# Patient Record
Sex: MALE | URBAN - METROPOLITAN AREA
[De-identification: names, ages, dates, MRNs, and addresses within clinical notes are randomized per-mention and may not be internally consistent; named-entity substitution may affect disease eponyms.]

---

## 2024-06-27 ENCOUNTER — APPOINTMENT (OUTPATIENT)
Dept: URBAN - METROPOLITAN AREA CLINIC 197 | Age: 74
Setting detail: DERMATOLOGY
End: 2024-06-27

## 2024-06-27 DIAGNOSIS — Q82.5 CONGENITAL NON-NEOPLASTIC NEVUS: ICD-10-CM

## 2024-06-27 DIAGNOSIS — D485 NEOPLASM OF UNCERTAIN BEHAVIOR OF SKIN: ICD-10-CM

## 2024-06-27 DIAGNOSIS — L82.1 OTHER SEBORRHEIC KERATOSIS: ICD-10-CM

## 2024-06-27 DIAGNOSIS — L57.0 ACTINIC KERATOSIS: ICD-10-CM

## 2024-06-27 PROBLEM — D48.5 NEOPLASM OF UNCERTAIN BEHAVIOR OF SKIN: Status: ACTIVE | Noted: 2024-06-27

## 2024-06-27 PROCEDURE — OTHER LIQUID NITROGEN: OTHER

## 2024-06-27 PROCEDURE — 99202 OFFICE O/P NEW SF 15 MIN: CPT | Mod: 25

## 2024-06-27 PROCEDURE — OTHER BIOPSY BY SHAVE METHOD: OTHER

## 2024-06-27 PROCEDURE — OTHER COUNSELING: OTHER

## 2024-06-27 PROCEDURE — 11103 TANGNTL BX SKIN EA SEP/ADDL: CPT

## 2024-06-27 PROCEDURE — OTHER MIPS QUALITY: OTHER

## 2024-06-27 PROCEDURE — 17003 DESTRUCT PREMALG LES 2-14: CPT

## 2024-06-27 PROCEDURE — 17000 DESTRUCT PREMALG LESION: CPT | Mod: 59

## 2024-06-27 PROCEDURE — 11102 TANGNTL BX SKIN SINGLE LES: CPT

## 2024-06-27 ASSESSMENT — LOCATION SIMPLE DESCRIPTION DERM
LOCATION SIMPLE: RIGHT CHEEK
LOCATION SIMPLE: POSTERIOR NECK
LOCATION SIMPLE: SCALP
LOCATION SIMPLE: NECK
LOCATION SIMPLE: LEFT CHEEK
LOCATION SIMPLE: NOSE

## 2024-06-27 ASSESSMENT — LOCATION ZONE DERM
LOCATION ZONE: SCALP
LOCATION ZONE: NOSE
LOCATION ZONE: NECK
LOCATION ZONE: FACE

## 2024-06-27 ASSESSMENT — LOCATION DETAILED DESCRIPTION DERM
LOCATION DETAILED: LEFT CENTRAL LATERAL NECK
LOCATION DETAILED: NASAL ROOT
LOCATION DETAILED: RIGHT SUPERIOR CENTRAL MALAR CHEEK
LOCATION DETAILED: LEFT SUPERIOR PARIETAL SCALP
LOCATION DETAILED: LEFT INFERIOR LATERAL MALAR CHEEK
LOCATION DETAILED: LEFT POSTERIOR NECK

## 2024-06-27 NOTE — PROCEDURE: LIQUID NITROGEN
Render Note In Bullet Format When Appropriate: No
Detail Level: Simple
Consent: The patient's consent was obtained including but not limited to risks of crusting, scabbing, blistering, scarring, darker or lighter pigmentary change, recurrence, incomplete removal and infection.
Show Aperture Variable?: Yes
Number Of Freeze-Thaw Cycles: 1 freeze-thaw cycle
Post-Care Instructions: I reviewed with the patient in detail post-care instructions. Patient is to wear sunprotection, and avoid picking at any of the treated lesions. Pt may apply Vaseline to crusted or scabbing areas.
Duration Of Freeze Thaw-Cycle (Seconds): 5

## 2024-06-27 NOTE — HPI: SKIN LESION
Is This A New Presentation, Or A Follow-Up?: Skin Lesion
Additional History: Pt reports a spot on his nose that has been present for months, not painful\\nHe would also like his face checked in general

## 2024-06-27 NOTE — PROCEDURE: BIOPSY BY SHAVE METHOD
Detail Level: Detailed
Depth Of Biopsy: dermis
Was A Bandage Applied: Yes
Size Of Lesion In Cm: 0
Biopsy Type: H and E
Biopsy Method: 10 blade
Anesthesia Type: 1% lidocaine with epinephrine
Anesthesia Volume In Cc: 0.5
Hemostasis: Electrocautery
Wound Care: No ointment
Dressing: Band-Aid
Destruction After The Procedure: No
Type Of Destruction Used: Curettage
Curettage Text: The wound bed was treated with curettage after the biopsy was performed.
Cryotherapy Text: The wound bed was treated with cryotherapy after the biopsy was performed.
Electrodesiccation Text: The wound bed was treated with electrodesiccation after the biopsy was performed.
Electrodesiccation And Curettage Text: The wound bed was treated with electrodesiccation and curettage after the biopsy was performed.
Silver Nitrate Text: The wound bed was treated with silver nitrate after the biopsy was performed.
Lab: [id number]
Consent: Verbal consent was obtained and risks were reviewed including but not limited to scarring, infection, bleeding, scabbing, incomplete removal, nerve damage and allergy to anesthesia.
Post-Care Instructions: I reviewed with the patient in detail post-care instructions. Patient is to keep the biopsy site dry overnight, and then apply vaseline if needed daily until healed.
Notification Instructions: Patient will be notified of biopsy results. However, patient instructed to call the office if not contacted within 2 weeks.
Billing Type: Third-Party Bill
Information: Selecting Yes will display possible errors in your note based on the variables you have selected. This validation is only offered as a suggestion for you. PLEASE NOTE THAT THE VALIDATION TEXT WILL BE REMOVED WHEN YOU FINALIZE YOUR NOTE. IF YOU WANT TO FAX A PRELIMINARY NOTE YOU WILL NEED TO TOGGLE THIS TO 'NO' IF YOU DO NOT WANT IT IN YOUR FAXED NOTE.

## 2024-10-01 ENCOUNTER — APPOINTMENT (OUTPATIENT)
Dept: URBAN - METROPOLITAN AREA CLINIC 197 | Age: 74
Setting detail: DERMATOLOGY
End: 2024-10-01

## 2024-10-01 DIAGNOSIS — L28.1 PRURIGO NODULARIS: ICD-10-CM

## 2024-10-01 PROBLEM — C44.311 BASAL CELL CARCINOMA OF SKIN OF NOSE: Status: ACTIVE | Noted: 2024-10-01

## 2024-10-01 PROCEDURE — OTHER PHOTO-DOCUMENTATION: OTHER

## 2024-10-01 PROCEDURE — OTHER OTHER: OTHER

## 2024-10-01 PROCEDURE — 99212 OFFICE O/P EST SF 10 MIN: CPT

## 2024-10-01 PROCEDURE — OTHER PATHOLOGY DISCUSSION: OTHER

## 2024-10-01 PROCEDURE — OTHER COUNSELING: OTHER

## 2024-10-01 PROCEDURE — OTHER MIPS QUALITY: OTHER

## 2024-10-01 ASSESSMENT — LOCATION SIMPLE DESCRIPTION DERM: LOCATION SIMPLE: POSTERIOR NECK

## 2024-10-01 ASSESSMENT — LOCATION ZONE DERM: LOCATION ZONE: NECK

## 2024-10-01 ASSESSMENT — LOCATION DETAILED DESCRIPTION DERM: LOCATION DETAILED: LEFT POSTERIOR NECK

## 2024-10-01 NOTE — PROCEDURE: OTHER
Other (Free Text): RD ED&C vs observation, Will monitor for now and recheck in 3 months
Detail Level: Zone
Render Risk Assessment In Note?: no
Note Text (......Xxx Chief Complaint.): This diagnosis correlates with the

## 2025-01-02 ENCOUNTER — APPOINTMENT (OUTPATIENT)
Dept: URBAN - METROPOLITAN AREA CLINIC 197 | Age: 75
Setting detail: DERMATOLOGY
End: 2025-01-02

## 2025-01-02 DIAGNOSIS — L57.0 ACTINIC KERATOSIS: ICD-10-CM

## 2025-01-02 PROBLEM — C44.311 BASAL CELL CARCINOMA OF SKIN OF NOSE: Status: ACTIVE | Noted: 2025-01-02

## 2025-01-02 PROCEDURE — OTHER COUNSELING: OTHER

## 2025-01-02 PROCEDURE — OTHER MIPS QUALITY: OTHER

## 2025-01-02 PROCEDURE — OTHER PHOTO-DOCUMENTATION: OTHER

## 2025-01-02 PROCEDURE — 17003 DESTRUCT PREMALG LES 2-14: CPT

## 2025-01-02 PROCEDURE — 17000 DESTRUCT PREMALG LESION: CPT

## 2025-01-02 PROCEDURE — 99212 OFFICE O/P EST SF 10 MIN: CPT | Mod: 25

## 2025-01-02 PROCEDURE — OTHER LIQUID NITROGEN: OTHER

## 2025-01-02 PROCEDURE — OTHER OTHER: OTHER

## 2025-01-02 ASSESSMENT — LOCATION SIMPLE DESCRIPTION DERM: LOCATION SIMPLE: RIGHT CHEEK

## 2025-01-02 ASSESSMENT — LOCATION ZONE DERM: LOCATION ZONE: FACE

## 2025-01-02 ASSESSMENT — LOCATION DETAILED DESCRIPTION DERM: LOCATION DETAILED: RIGHT SUPERIOR LATERAL MALAR CHEEK

## 2025-01-02 NOTE — PROCEDURE: OTHER
Other (Free Text): Recheck in 6 months
Detail Level: Zone
Render Risk Assessment In Note?: no
Note Text (......Xxx Chief Complaint.): This diagnosis correlates with the

## 2025-01-02 NOTE — PROCEDURE: LIQUID NITROGEN
Render Note In Bullet Format When Appropriate: No
Duration Of Freeze Thaw-Cycle (Seconds): 5
Show Aperture Variable?: Yes
Consent: The patient's consent was obtained including but not limited to risks of crusting, scabbing, blistering, scarring, darker or lighter pigmentary change, recurrence, incomplete removal and infection.
Post-Care Instructions: I reviewed with the patient in detail post-care instructions. Patient is to wear sunprotection, and avoid picking at any of the treated lesions. Pt may apply Vaseline to crusted or scabbing areas.
Number Of Freeze-Thaw Cycles: 1 freeze-thaw cycle
Detail Level: Simple

## 2025-07-02 ENCOUNTER — APPOINTMENT (OUTPATIENT)
Dept: URBAN - METROPOLITAN AREA CLINIC 197 | Age: 75
Setting detail: DERMATOLOGY
End: 2025-07-03

## 2025-07-02 DIAGNOSIS — L57.0 ACTINIC KERATOSIS: ICD-10-CM

## 2025-07-02 PROBLEM — C44.311 BASAL CELL CARCINOMA OF SKIN OF NOSE: Status: ACTIVE | Noted: 2025-07-02

## 2025-07-02 PROCEDURE — OTHER DEFER: OTHER

## 2025-07-02 PROCEDURE — OTHER COUNSELING: OTHER

## 2025-07-02 PROCEDURE — OTHER PHOTO-DOCUMENTATION: OTHER

## 2025-07-02 PROCEDURE — OTHER MIPS QUALITY: OTHER

## 2025-07-02 PROCEDURE — 99213 OFFICE O/P EST LOW 20 MIN: CPT

## 2025-07-02 ASSESSMENT — LOCATION DETAILED DESCRIPTION DERM: LOCATION DETAILED: RIGHT MEDIAL ZYGOMA

## 2025-07-02 ASSESSMENT — LOCATION SIMPLE DESCRIPTION DERM: LOCATION SIMPLE: RIGHT ZYGOMA

## 2025-07-02 ASSESSMENT — LOCATION ZONE DERM: LOCATION ZONE: FACE
